# Patient Record
Sex: MALE | URBAN - METROPOLITAN AREA
[De-identification: names, ages, dates, MRNs, and addresses within clinical notes are randomized per-mention and may not be internally consistent; named-entity substitution may affect disease eponyms.]

---

## 2024-05-08 ENCOUNTER — LAB OUTSIDE AN ENCOUNTER (OUTPATIENT)
Dept: URBAN - METROPOLITAN AREA CLINIC 98 | Facility: CLINIC | Age: 74
End: 2024-05-08

## 2024-05-08 ENCOUNTER — DASHBOARD ENCOUNTERS (OUTPATIENT)
Age: 74
End: 2024-05-08

## 2024-05-08 ENCOUNTER — OFFICE VISIT (OUTPATIENT)
Dept: URBAN - METROPOLITAN AREA CLINIC 98 | Facility: CLINIC | Age: 74
End: 2024-05-08
Payer: MEDICARE

## 2024-05-08 VITALS
HEIGHT: 73 IN | BODY MASS INDEX: 22.53 KG/M2 | SYSTOLIC BLOOD PRESSURE: 130 MMHG | DIASTOLIC BLOOD PRESSURE: 80 MMHG | TEMPERATURE: 97.1 F | WEIGHT: 170 LBS | HEART RATE: 66 BPM

## 2024-05-08 DIAGNOSIS — K59.00 CONSTIPATION, UNSPECIFIED CONSTIPATION TYPE: ICD-10-CM

## 2024-05-08 DIAGNOSIS — R10.84 GENERALIZED ABDOMINAL PAIN: ICD-10-CM

## 2024-05-08 DIAGNOSIS — R63.4 WEIGHT LOSS: ICD-10-CM

## 2024-05-08 DIAGNOSIS — Z79.620 LONG TERM (CURRENT) USE OF IMMUNOSUPPRESSIVE BIOLOGIC: ICD-10-CM

## 2024-05-08 DIAGNOSIS — L40.50 PSORIATIC ARTHRITIS: ICD-10-CM

## 2024-05-08 PROBLEM — 102614006: Status: ACTIVE | Noted: 2024-05-08

## 2024-05-08 PROBLEM — 87522002: Status: ACTIVE | Noted: 2024-05-08

## 2024-05-08 PROBLEM — 14760008: Status: ACTIVE | Noted: 2024-05-08

## 2024-05-08 PROBLEM — 156370009: Status: ACTIVE | Noted: 2024-05-08

## 2024-05-08 PROCEDURE — 99205 OFFICE O/P NEW HI 60 MIN: CPT | Performed by: INTERNAL MEDICINE

## 2024-05-08 RX ORDER — INFLIXIMAB 100 MG/10ML
5 MG/KG INJECTION, POWDER, LYOPHILIZED, FOR SOLUTION INTRAVENOUS
Status: ACTIVE | COMMUNITY

## 2024-05-08 RX ORDER — SIMVASTATIN 20 MG
1 TABLET IN THE EVENING TABLET ORAL ONCE A DAY
Status: ACTIVE | COMMUNITY

## 2024-05-16 ENCOUNTER — LAB OUTSIDE AN ENCOUNTER (OUTPATIENT)
Dept: URBAN - METROPOLITAN AREA CLINIC 98 | Facility: CLINIC | Age: 74
End: 2024-05-16

## 2024-05-19 LAB
CREATININE POC: 1
PERFORMING LAB: (no result)

## 2024-06-05 ENCOUNTER — TELEPHONE ENCOUNTER (OUTPATIENT)
Dept: URBAN - METROPOLITAN AREA CLINIC 98 | Facility: CLINIC | Age: 74
End: 2024-06-05

## 2024-06-05 ENCOUNTER — CLAIMS CREATED FROM THE CLAIM WINDOW (OUTPATIENT)
Dept: URBAN - METROPOLITAN AREA CLINIC 4 | Facility: CLINIC | Age: 74
End: 2024-06-05
Payer: MEDICARE

## 2024-06-05 ENCOUNTER — OUT OF OFFICE VISIT (OUTPATIENT)
Dept: URBAN - METROPOLITAN AREA SURGERY CENTER 18 | Facility: SURGERY CENTER | Age: 74
End: 2024-06-05
Payer: MEDICARE

## 2024-06-05 DIAGNOSIS — K25.9 ANTRAL ULCER: ICD-10-CM

## 2024-06-05 DIAGNOSIS — K63.89 OTHER SPECIFIED DISEASES OF INTESTINE: ICD-10-CM

## 2024-06-05 DIAGNOSIS — K64.4 EXTERNAL HEMORRHOIDS WITHOUT COMPLICATION: ICD-10-CM

## 2024-06-05 DIAGNOSIS — K29.80 ACUTE DUODENITIS: ICD-10-CM

## 2024-06-05 DIAGNOSIS — D50.8 OTHER IRON DEFICIENCY ANEMIA: ICD-10-CM

## 2024-06-05 DIAGNOSIS — D64.89 ANEMIA DUE TO OTHER CAUSE: ICD-10-CM

## 2024-06-05 DIAGNOSIS — K52.89 OTHER AND UNSPECIFIED NONINFECTIOUS GASTROENTERITIS AND COLITIS: ICD-10-CM

## 2024-06-05 DIAGNOSIS — K21.9 ACID REFLUX: ICD-10-CM

## 2024-06-05 DIAGNOSIS — K29.81 DUODENITIS WITH BLEEDING: ICD-10-CM

## 2024-06-05 DIAGNOSIS — K63.3 ILEUM ULCER: ICD-10-CM

## 2024-06-05 DIAGNOSIS — K21.9 GASTRO-ESOPHAGEAL REFLUX DISEASE WITHOUT ESOPHAGITIS: ICD-10-CM

## 2024-06-05 DIAGNOSIS — K31.89 OTHER DISEASES OF STOMACH AND DUODENUM: ICD-10-CM

## 2024-06-05 PROCEDURE — 00811 ANES LWR INTST NDSC NOS: CPT | Performed by: NURSE ANESTHETIST, CERTIFIED REGISTERED

## 2024-06-05 PROCEDURE — 88312 SPECIAL STAINS GROUP 1: CPT | Performed by: PATHOLOGY

## 2024-06-05 PROCEDURE — 88305 TISSUE EXAM BY PATHOLOGIST: CPT | Performed by: PATHOLOGY

## 2024-06-05 PROCEDURE — 43239 EGD BIOPSY SINGLE/MULTIPLE: CPT | Performed by: INTERNAL MEDICINE

## 2024-06-05 PROCEDURE — 45380 COLONOSCOPY AND BIOPSY: CPT | Performed by: INTERNAL MEDICINE

## 2024-06-05 PROCEDURE — G8907 PT DOC NO EVENTS ON DISCHARG: HCPCS | Performed by: INTERNAL MEDICINE

## 2024-06-05 RX ORDER — INFLIXIMAB 100 MG/10ML
5 MG/KG INJECTION, POWDER, LYOPHILIZED, FOR SOLUTION INTRAVENOUS
Status: ACTIVE | COMMUNITY

## 2024-06-05 RX ORDER — SIMVASTATIN 20 MG
1 TABLET IN THE EVENING TABLET ORAL ONCE A DAY
Status: ACTIVE | COMMUNITY

## 2024-06-05 RX ORDER — OMEPRAZOLE 40 MG/1
1 CAPSULE 30 MINUTES BEFORE MORNING MEAL CAPSULE, DELAYED RELEASE ORAL ONCE A DAY
Qty: 90 CAPSULES | Refills: 3
Start: 2024-06-05

## 2024-06-05 RX ORDER — OMEPRAZOLE 40 MG/1
1 CAPSULE 30 MINUTES BEFORE MORNING MEAL CAPSULE, DELAYED RELEASE ORAL ONCE A DAY
Qty: 90 CAPSULES | Refills: 3 | OUTPATIENT
Start: 2024-06-05

## 2024-06-17 ENCOUNTER — TELEPHONE ENCOUNTER (OUTPATIENT)
Dept: URBAN - METROPOLITAN AREA CLINIC 98 | Facility: CLINIC | Age: 74
End: 2024-06-17

## 2024-06-18 PROBLEM — 56689002: Status: ACTIVE | Noted: 2024-06-18

## 2024-06-26 ENCOUNTER — OFFICE VISIT (OUTPATIENT)
Dept: URBAN - METROPOLITAN AREA CLINIC 98 | Facility: CLINIC | Age: 74
End: 2024-06-26
Payer: MEDICARE

## 2024-06-26 ENCOUNTER — OFFICE VISIT (OUTPATIENT)
Dept: URBAN - METROPOLITAN AREA CLINIC 98 | Facility: CLINIC | Age: 74
End: 2024-06-26

## 2024-06-26 VITALS
DIASTOLIC BLOOD PRESSURE: 73 MMHG | TEMPERATURE: 97.1 F | HEART RATE: 60 BPM | BODY MASS INDEX: 22.77 KG/M2 | HEIGHT: 73 IN | WEIGHT: 171.8 LBS | SYSTOLIC BLOOD PRESSURE: 144 MMHG

## 2024-06-26 DIAGNOSIS — K59.01 CONSTIPATION: ICD-10-CM

## 2024-06-26 DIAGNOSIS — R10.84 GENERALIZED ABDOMINAL PAIN: ICD-10-CM

## 2024-06-26 DIAGNOSIS — K50.00 CROHN'S DISEASE OF SMALL INTESTINE WITHOUT COMPLICATION: ICD-10-CM

## 2024-06-26 DIAGNOSIS — D50.8 OTHER IRON DEFICIENCY ANEMIA: ICD-10-CM

## 2024-06-26 PROCEDURE — 99215 OFFICE O/P EST HI 40 MIN: CPT | Performed by: INTERNAL MEDICINE

## 2024-06-26 RX ORDER — INFLIXIMAB 100 MG/10ML
5 MG/KG INJECTION, POWDER, LYOPHILIZED, FOR SOLUTION INTRAVENOUS
Status: ACTIVE | COMMUNITY

## 2024-06-26 RX ORDER — OMEPRAZOLE 40 MG/1
1 CAPSULE 30 MINUTES BEFORE MORNING MEAL CAPSULE, DELAYED RELEASE ORAL ONCE A DAY
Qty: 90 CAPSULES | Refills: 3 | Status: ACTIVE | COMMUNITY
Start: 2024-06-05

## 2024-06-26 RX ORDER — SIMVASTATIN 20 MG
1 TABLET IN THE EVENING TABLET ORAL ONCE A DAY
Status: ACTIVE | COMMUNITY

## 2024-06-26 NOTE — HPI-TODAY'S VISIT:
75 yo male with newly diagnosed Crohn's disease based on colonoscopy 6/5/24 and peptic vs. Crohn's duodenitis and feeling better abdominal pain wise on omeprazole 40 mg a day and will need long term tx likely.  Tx of Crohn's will be based on pending labs ESR/CRP/Lacto/Calpro and IFXlevel.  AGWS. Prior labs from Dr. LINDER showed slight/borderline anemia.     ============================= 5/8/24  74yo male  of [place] who comes in for GI. Sees Dr. Marie. Sees Dr. Henderson q 4 years. Last had colonoscopy - wnl - ? one small polyp 2021. No GI syx until July 2023 and syx have gotten worse since then.  Thinks he has anemia. He is on Remicade for psoriatic arthritis. Raised Enbrel to Tier 4, and 2 years ago, started Remiocade Saw Dr. Myerson for 5-7 yearrs and now Dr. Lemus.  Has constipation. Used to have an urge to go and now has to strain. He raises his legs and is able to go. BM every 1-2. No blood in stool.   Has abdominal pain, and was intermittent, daily and now more or less. Mid and lower pain, may last an hour  Weight loss maybe due to GI discomfort, now 170 and was 175, when overweight-178. No FH of Colon cancer, stomach cancer.  No heart or lung issues. Sees [facility].  [facility].  Sees Dr. Campbell - Squamous cell ca removed.  Prostate fine per Dr. Marie.  Blood work by Dr. Marie showed "anemia and low B12".

## 2024-06-26 NOTE — PHYSICAL EXAM RECTAL:
normal tone, no external hemorrhoids, no masses palpable, no red blood, Tenderness on TREY, Internal hemorrhoids present

## 2024-07-01 ENCOUNTER — LAB OUTSIDE AN ENCOUNTER (OUTPATIENT)
Dept: URBAN - METROPOLITAN AREA CLINIC 98 | Facility: CLINIC | Age: 74
End: 2024-07-01

## 2024-07-09 LAB
CALPROTECTIN, FECAL: 85
LACTOFERRIN, FECAL, QUANT.: 3.66

## 2024-07-30 ENCOUNTER — OFFICE VISIT (OUTPATIENT)
Dept: URBAN - METROPOLITAN AREA CLINIC 98 | Facility: CLINIC | Age: 74
End: 2024-07-30

## 2024-07-30 ENCOUNTER — TELEPHONE ENCOUNTER (OUTPATIENT)
Dept: URBAN - METROPOLITAN AREA CLINIC 98 | Facility: CLINIC | Age: 74
End: 2024-07-30

## 2024-07-30 VITALS
WEIGHT: 172 LBS | HEIGHT: 73 IN | BODY MASS INDEX: 22.8 KG/M2 | TEMPERATURE: 97.1 F | DIASTOLIC BLOOD PRESSURE: 91 MMHG | SYSTOLIC BLOOD PRESSURE: 188 MMHG | HEART RATE: 62 BPM

## 2024-07-30 DIAGNOSIS — Z79.620 LONG TERM (CURRENT) USE OF IMMUNOSUPPRESSIVE BIOLOGIC: ICD-10-CM

## 2024-07-30 DIAGNOSIS — K50.00 CROHN'S DISEASE OF SMALL INTESTINE WITHOUT COMPLICATION: ICD-10-CM

## 2024-07-30 DIAGNOSIS — R10.84 GENERALIZED ABDOMINAL PAIN: ICD-10-CM

## 2024-07-30 DIAGNOSIS — L40.50 PSORIATIC ARTHRITIS: ICD-10-CM

## 2024-07-30 PROBLEM — 156370009: Status: ACTIVE | Noted: 2024-07-30

## 2024-07-30 RX ORDER — BUDESONIDE 3 MG/1
3 CAPSULES CAPSULE ORAL
Qty: 270 CAPSULES | Refills: 3
Start: 2024-07-30

## 2024-07-30 RX ORDER — OMEPRAZOLE 40 MG/1
1 CAPSULE 30 MINUTES BEFORE MORNING MEAL CAPSULE, DELAYED RELEASE ORAL ONCE A DAY
Qty: 90 CAPSULES | Refills: 3 | Status: ACTIVE | COMMUNITY
Start: 2024-06-05

## 2024-07-30 RX ORDER — INFLIXIMAB 100 MG/10ML
5 MG/KG INJECTION, POWDER, LYOPHILIZED, FOR SOLUTION INTRAVENOUS
Status: ACTIVE | COMMUNITY

## 2024-07-30 RX ORDER — BUDESONIDE 3 MG/1
3 CAPSULES CAPSULE ORAL
Qty: 90 CAPSULES | Refills: 3 | OUTPATIENT
Start: 2024-07-30

## 2024-07-30 RX ORDER — BUDESONIDE 9 MG/1
1 TABLET IN THE MORNING TABLET, FILM COATED, EXTENDED RELEASE ORAL ONCE A DAY
Status: ACTIVE | COMMUNITY

## 2024-07-30 RX ORDER — SIMVASTATIN 20 MG
1 TABLET IN THE EVENING TABLET ORAL ONCE A DAY
Status: ACTIVE | COMMUNITY

## 2024-07-30 NOTE — HPI-TODAY'S VISIT:
73 yo male with PSA and newly dx CD comes in for 1 month urgent f/u. Went to the ER 2 days ago because of 24 hours of abd pain and nausea. Lower abdominal pain. Nausea but no vomiting. Worse LLQ tender. Given Iv steroids and rx budesonide and given 6 mg, for 12 days of budesonide.  Should be upgraded to higher dose Remicade, now 692 mg q 8 weeks.for 172 lb/80 kg weight. Pain was LLq but CT showed ileo cecal activity.  Entocort/bud seems to be helping and will increase to 9 mg q am. Prednisone if he flares. Also increase omeprazole to 40 mg bid for now.  Trough IFX level sent 5 days ago.  Had ER visit 2 days after getting infusion  sneha Lemus. CT scan from ER visit 7/28 was much worse than prior CT in May  ========================== 6/26/24  73 yo male with newly diagnosed Crohn's disease based on colonoscopy 6/5/24 and peptic vs. Crohn's duodenitis and feeling better abdominal pain wise on omeprazole 40 mg a day and will need long term tx likely.  Tx of Crohn's will be based on pending labs ESR/CRP/Lacto/Calpro and IFXlevel.  AGWS. Prior labs from Dr. LINDER showed slight/borderline anemia.     ============================= 5/8/24  72yo male  of [place] who comes in for GI. Sees Dr. Marie. Sees Dr. Henderson q 4 years. Last had colonoscopy - wnl - ? one small polyp 2021. No GI syx until July 2023 and syx have gotten worse since then.  Thinks he has anemia. He is on Remicade for psoriatic arthritis. Raised Enbrel to Tier 4, and 2 years ago, started Remiocade Saw Dr. Myerson for 5-7 yearrs and now Dr. Lemus.  Has constipation. Used to have an urge to go and now has to strain. He raises his legs and is able to go. BM every 1-2. No blood in stool.   Has abdominal pain, and was intermittent, daily and now more or less. Mid and lower pain, may last an hour  Weight loss maybe due to GI discomfort, now 170 and was 175, when overweight-178. No FH of Colon cancer, stomach cancer.  No heart or lung issues. Sees [facility].  [facility].  Sees Dr. Campbell - Squamous cell ca removed.  Prostate fine per Dr. Marie.  Blood work by Dr. Marie showed "anemia and low B12".

## 2024-08-06 ENCOUNTER — TELEPHONE ENCOUNTER (OUTPATIENT)
Dept: URBAN - METROPOLITAN AREA CLINIC 98 | Facility: CLINIC | Age: 74
End: 2024-08-06

## 2024-08-06 RX ORDER — BUDESONIDE 3 MG/1
3 CAPSULES CAPSULE ORAL
Qty: 270 CAPSULES | Refills: 3
Start: 2024-07-30

## 2024-08-07 ENCOUNTER — TELEPHONE ENCOUNTER (OUTPATIENT)
Dept: URBAN - METROPOLITAN AREA CLINIC 98 | Facility: CLINIC | Age: 74
End: 2024-08-07

## 2024-08-07 RX ORDER — BUDESONIDE 3 MG/1
3 CAPSULES CAPSULE ORAL ONCE A DAY
Qty: 90 CAPSULE | Refills: 5
Start: 2024-07-30

## 2024-08-08 ENCOUNTER — OFFICE VISIT (OUTPATIENT)
Dept: URBAN - METROPOLITAN AREA CLINIC 98 | Facility: CLINIC | Age: 74
End: 2024-08-08

## 2024-08-21 ENCOUNTER — OFFICE VISIT (OUTPATIENT)
Dept: URBAN - METROPOLITAN AREA CLINIC 98 | Facility: CLINIC | Age: 74
End: 2024-08-21
Payer: MEDICARE

## 2024-08-21 VITALS
TEMPERATURE: 97.3 F | HEART RATE: 68 BPM | DIASTOLIC BLOOD PRESSURE: 68 MMHG | BODY MASS INDEX: 22.77 KG/M2 | HEIGHT: 73 IN | WEIGHT: 171.8 LBS | SYSTOLIC BLOOD PRESSURE: 131 MMHG

## 2024-08-21 DIAGNOSIS — L40.50 PSORIATIC ARTHRITIS: ICD-10-CM

## 2024-08-21 DIAGNOSIS — K50.00 CROHN'S DISEASE OF SMALL INTESTINE WITHOUT COMPLICATION: ICD-10-CM

## 2024-08-21 DIAGNOSIS — Z79.620 LONG TERM (CURRENT) USE OF IMMUNOSUPPRESSIVE BIOLOGIC: ICD-10-CM

## 2024-08-21 DIAGNOSIS — R10.84 GENERALIZED ABDOMINAL PAIN: ICD-10-CM

## 2024-08-21 PROCEDURE — 99215 OFFICE O/P EST HI 40 MIN: CPT | Performed by: INTERNAL MEDICINE

## 2024-08-21 RX ORDER — INFLIXIMAB 100 MG/10ML
5 MG/KG INJECTION, POWDER, LYOPHILIZED, FOR SOLUTION INTRAVENOUS
Status: ACTIVE | COMMUNITY

## 2024-08-21 RX ORDER — BUDESONIDE 9 MG/1
1 TABLET IN THE MORNING TABLET, FILM COATED, EXTENDED RELEASE ORAL ONCE A DAY
Status: ACTIVE | COMMUNITY

## 2024-08-21 RX ORDER — BUDESONIDE 3 MG/1
3 CAPSULES CAPSULE ORAL ONCE A DAY
Qty: 90 CAPSULE | Refills: 5 | Status: ACTIVE | COMMUNITY
Start: 2024-07-30

## 2024-08-21 RX ORDER — SIMVASTATIN 20 MG
1 TABLET IN THE EVENING TABLET ORAL ONCE A DAY
Status: ACTIVE | COMMUNITY

## 2024-08-21 RX ORDER — OMEPRAZOLE 40 MG/1
1 CAPSULE 30 MINUTES BEFORE MORNING MEAL CAPSULE, DELAYED RELEASE ORAL ONCE A DAY
Qty: 90 CAPSULES | Refills: 3 | Status: ACTIVE | COMMUNITY
Start: 2024-06-05

## 2024-08-21 NOTE — HPI-TODAY'S VISIT:
75 yo male with CD and PSA on Michael 9 mg/kg q 8 for PSA comes iin for f/u. IFX level at trough 5.5, and yet had active CD onset and flare. Needs higher level - >10 or different meds. Doing well on Entocort 9 mg a day.  Discussed with Dr. Lemus the need to increase dosing ideally to 10 mg/kg q 6 (or q 4).  Puzzling aspect is nl blood and stool biomarkers with this flare, before??  So he may be "failing" IFX from standpoint of Crohn's, will see if higher dosing helps. Will see in f/u 2 weeks after d/c of 8 week course of Entocort.  ========================== 7/30/24  75 yo male with PSA and newly dx CD comes in for 1 month urgent f/u. Went to the ER 2 days ago because of 24 hours of abd pain and nausea. Lower abdominal pain. Nausea but no vomiting. Worse LLQ tender. Given Iv steroids and rx budesonide and given 6 mg, for 12 days of budesonide.  Should be upgraded to higher dose Remicade, now 692 mg q 8 weeks.for 172 lb/80 kg weight. Pain was LLq but CT showed ileo cecal activity.  Entocort/bud seems to be helping and will increase to 9 mg q am. Prednisone if he flares. Also increase omeprazole to 40 mg bid for now.  Trough IFX level sent 5 days ago.  Had ER visit 2 days after getting infusion  sneha Lemus. CT scan from ER visit 7/28 was much worse than prior CT in May  ========================== 6/26/24  75 yo male with newly diagnosed Crohn's disease based on colonoscopy 6/5/24 and peptic vs. Crohn's duodenitis and feeling better abdominal pain wise on omeprazole 40 mg a day and will need long term tx likely.  Tx of Crohn's will be based on pending labs ESR/CRP/Lacto/Calpro and IFXlevel.  AGWS. Prior labs from Dr. LINDER showed slight/borderline anemia.     ============================= 5/8/24  74yo male  of [place] who comes in for GI. Sees Dr. Marie. Sees Dr. Henderson q 4 years. Last had colonoscopy - wnl - ? one small polyp 2021. No GI syx until July 2023 and syx have gotten worse since then.  Thinks he has anemia. He is on Remicade for psoriatic arthritis. Raised Enbrel to Tier 4, and 2 years ago, started Remiocade Saw Dr. Myerson for 5-7 yearrs and now Dr. Lemus.  Has constipation. Used to have an urge to go and now has to strain. He raises his legs and is able to go. BM every 1-2. No blood in stool.   Has abdominal pain, and was intermittent, daily and now more or less. Mid and lower pain, may last an hour  Weight loss maybe due to GI discomfort, now 170 and was 175, when overweight-178. No FH of Colon cancer, stomach cancer.  No heart or lung issues. Sees [facility].  [facility] cardiovascular.  Sees Dr. Campbell - Squamous cell ca removed.  Prostate fine per Dr. Marie.  Blood work by Dr. Marie showed "anemia and low B12".

## 2024-08-26 ENCOUNTER — TELEPHONE ENCOUNTER (OUTPATIENT)
Dept: URBAN - METROPOLITAN AREA CLINIC 98 | Facility: CLINIC | Age: 74
End: 2024-08-26

## 2024-08-26 RX ORDER — OMEPRAZOLE 40 MG/1
1 CAPSULE 30 MINUTES BEFORE MORNING MEAL CAPSULE, DELAYED RELEASE ORAL ONCE A DAY
Qty: 90 CAPSULES | Refills: 3
Start: 2024-06-05

## 2024-10-24 ENCOUNTER — OFFICE VISIT (OUTPATIENT)
Dept: URBAN - METROPOLITAN AREA CLINIC 98 | Facility: CLINIC | Age: 74
End: 2024-10-24

## 2025-02-25 ENCOUNTER — TELEPHONE ENCOUNTER (OUTPATIENT)
Dept: URBAN - METROPOLITAN AREA CLINIC 98 | Facility: CLINIC | Age: 75
End: 2025-02-25

## 2025-02-25 RX ORDER — OMEPRAZOLE 40 MG/1
1 CAPSULE 30 MINUTES BEFORE MORNING MEAL CAPSULE, DELAYED RELEASE ORAL ONCE A DAY
Qty: 90 CAPSULES | Refills: 3
Start: 2024-06-05

## 2025-05-22 ENCOUNTER — TELEPHONE ENCOUNTER (OUTPATIENT)
Dept: URBAN - METROPOLITAN AREA CLINIC 98 | Facility: CLINIC | Age: 75
End: 2025-05-22

## 2025-05-22 RX ORDER — OMEPRAZOLE 40 MG/1
1 CAPSULE 30 MINUTES BEFORE MORNING MEAL CAPSULE, DELAYED RELEASE ORAL ONCE A DAY
Qty: 90 CAPSULES | Refills: 3
Start: 2024-06-05

## 2025-08-18 ENCOUNTER — TELEPHONE ENCOUNTER (OUTPATIENT)
Dept: URBAN - METROPOLITAN AREA CLINIC 98 | Facility: CLINIC | Age: 75
End: 2025-08-18

## 2025-08-18 RX ORDER — OMEPRAZOLE 40 MG/1
1 CAPSULE 30 MINUTES BEFORE MORNING MEAL CAPSULE, DELAYED RELEASE ORAL ONCE A DAY
Qty: 90 CAPSULES | Refills: 3
Start: 2024-06-05